# Patient Record
Sex: MALE | Race: WHITE
[De-identification: names, ages, dates, MRNs, and addresses within clinical notes are randomized per-mention and may not be internally consistent; named-entity substitution may affect disease eponyms.]

---

## 2021-02-24 ENCOUNTER — HOSPITAL ENCOUNTER (EMERGENCY)
Dept: HOSPITAL 39 - ER | Age: 26
Discharge: HOME | End: 2021-02-24
Payer: SELF-PAY

## 2021-02-24 VITALS — OXYGEN SATURATION: 99 % | TEMPERATURE: 98.2 F

## 2021-02-24 VITALS — SYSTOLIC BLOOD PRESSURE: 128 MMHG | DIASTOLIC BLOOD PRESSURE: 86 MMHG
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
## 2021-02-24 DIAGNOSIS — S51.832A: ICD-10-CM

## 2021-02-24 DIAGNOSIS — S81.852A: Primary | ICD-10-CM

## 2021-02-24 DIAGNOSIS — S60.511A: ICD-10-CM
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
## 2021-02-24 NOTE — ED.PDOC
History of Present Illness





- General


Chief Complaint: Bite: Animal/Insect/Human


Stated Complaint: dog bite


Time Seen by Provider: 02/24/21 19:08


Source: patient, RN notes reviewed


Exam Limitations: no limitations





- History of Present Illness


Initial Comments: 





26 yo otherwise healthy male, patients dogs were fighting and he tried to split 

them apart, comes in with puncture wounds to leg and UE.  [place] 

[place].  Dogs vaccines up to date. 


Home Medications: 


Ambulatory Orders





Amoxicillin & Pot Clavulanate [Augmentin Tab] 875 mg PO BID 7 Days #14 tab 

02/24/21 











Review of Systems





- Review of Systems


Constitutional: Denies: chills, fever


EENTM: Denies: blurred vision


Respiratory: Denies: cough


Cardiology: Denies: chest pain


Gastrointestinal/Abdominal: Denies: abdominal pain, nausea, vomiting


Musculoskeletal: Denies: back pain, joint pain, muscle pain


Skin: States: see HPI


Neurological: Denies: numbness, paresthesia, tingling, tremors, weakness


Endocrine: Denies: unexplained weight loss


Hematologic/Lymphatic: Denies: blood clots, easy bleeding, easy bruising





Past Medical History (General)





- Patient Medical History


Hx Seizures: No


Hx Stroke: No


Hx Dementia: No


Hx Asthma: No


Hx of COPD: No


Hx Cardiac Disorders: No


Hx Congestive Heart Failure: No


Hx Pacemaker: No


Hx Hypertension: No


Hx Thyroid Disease: No


Hx Diabetes: No


Hx Gastroesophageal Reflux: No


Hx Renal Disease: No


Hx Cancer: No


Hx of HIV: No


Hx Hepatitis B: No


Hx Hepatitis C: No


Hx MRSA: No


Hx Other PMH: No





- Social History


Hx Alcohol Use: Yes





Family Medical History





- Family History


  ** Mother


Family History: Unknown


Living Status: Still Living





Physical Exam





- Physical Exam


General Appearance: Alert, Comfortable, No apparent distress, Well Developed, 

Well Groomed, Well Hydrated, Well Nourished


Eye Exam: bilateral normal


Ears, Nose, Throat: normal ENT inspection


Neck: non-tender, full range of motion, supple, normal inspection


Respiratory: chest non-tender, lungs clear, normal breath sounds, no respiratory

distress, no accessory muscle use


Cardiovascular/Chest: regular rate, rhythm


Peripheral Pulses: radial,right: 2+, radial,left: 2+, dorsalis pedis,right: 2+, 

dorsalis pedis,left: 2+, posterior tibialis,right: 2+, posterior tibialis,left: 

2+


Gastrointestinal/Abdominal: normal bowel sounds, non tender, soft, no 

organomegaly


Rectal Exam: deferred


Back Exam: normal inspection


Extremity: normal range of motion, other - two puncture wounds on posterior left

leg, one abrasian on right hand and one puncture one on left forearm. 


Neurologic: no motor/sensory deficits, alert, normal mood/affect


Skin Exam: normal color, warm/dry


Comments: 





hand exam jamaal: Symmetrically palpable radial and ulnar pulses. Capillary refill 

<2 seconds to all digits.


Intact sensation to light touch of the radial, median and ulnar nerves 

demonstrated by testing in the dorsal web space of the thumb, the distal palmar 

aspect of the index finger, and the lateral surface of the fifth finger.


Intact motor function of the radial, median and ulnar nerves demonstrated by 

strength of extension of the isolated distal joint of the index finger, hand 

, and spreading of the 2nd through 5th digits. Intact recurrent median nerve

as demonstrated by ability to move thumb fully through opposition, abduction and

flexion.





Front/Back of Body, Lg (Color): 


                            __________________________














                            __________________________





 1 - puncture wound





 2 - puncture wound





 3 - puncture wound





 4 - abrasian, flap








Progress





- Results/Orders


Results/Orders: 





wound was cleaned with water.  the wounds were approximated with steri strips. 

The data reviewed when caring for this patient included: nurse notes, prior 

records, etc. The history and assessments from nurses notes were reviewed and 

considered. My assessment and the results of testing completed here in the ED 

were discussed with the patient/family. All questions were answered, and they 

express understanding of my assessment and the plan. They have been instructed 

to return if their symptoms worsen, and have been asked to follow up with their 

primary care physician to recheck today's presenting complaint. I have reviewed 

medication, benefits, alternatives and side effects. 


Maxine Figueroa DO #[id number]





Departure





- Departure


Clinical Impression: 


 Bite wound, Puncture wound





Dog bite


Qualifiers:


 Encounter type: initial encounter Qualified Code(s): W54.0XXA - Bitten by dog, 

initial encounter





Time of Disposition: 19:33


Disposition: Discharge to Home or Self Care


Condition: Fair


Departure Forms:  ED Discharge - Pt. Copy, Patient Portal Self Enrollment


Instructions:  DI for Animal Bites, Animal Bites (DC), Wound Care (DC)


Diet: resume usual diet


Activity: increase activity as tolerated


Prescriptions: 


Amoxicillin & Pot Clavulanate [Augmentin Tab] 875 mg PO BID 7 Days #14 tab


Home Medications: 


Ambulatory Orders





Amoxicillin & Pot Clavulanate [Augmentin Tab] 875 mg PO BID 7 Days #14 tab 

02/24/21